# Patient Record
Sex: MALE | Race: WHITE
[De-identification: names, ages, dates, MRNs, and addresses within clinical notes are randomized per-mention and may not be internally consistent; named-entity substitution may affect disease eponyms.]

---

## 2020-01-06 ENCOUNTER — HOSPITAL ENCOUNTER (EMERGENCY)
Dept: HOSPITAL 39 - ER | Age: 65
Discharge: HOME | End: 2020-01-06
Payer: COMMERCIAL

## 2020-01-06 VITALS — DIASTOLIC BLOOD PRESSURE: 80 MMHG | TEMPERATURE: 98.1 F | SYSTOLIC BLOOD PRESSURE: 142 MMHG | OXYGEN SATURATION: 97 %

## 2020-01-06 DIAGNOSIS — N28.1: ICD-10-CM

## 2020-01-06 DIAGNOSIS — N13.2: Primary | ICD-10-CM

## 2020-01-06 DIAGNOSIS — K57.32: ICD-10-CM

## 2020-01-06 PROCEDURE — 74176 CT ABD & PELVIS W/O CONTRAST: CPT

## 2020-01-06 PROCEDURE — 85025 COMPLETE CBC W/AUTO DIFF WBC: CPT

## 2020-01-06 PROCEDURE — 80048 BASIC METABOLIC PNL TOTAL CA: CPT

## 2020-01-06 PROCEDURE — 80076 HEPATIC FUNCTION PANEL: CPT

## 2020-01-06 PROCEDURE — 81001 URINALYSIS AUTO W/SCOPE: CPT

## 2020-01-06 PROCEDURE — 36415 COLL VENOUS BLD VENIPUNCTURE: CPT

## 2020-01-06 PROCEDURE — 83690 ASSAY OF LIPASE: CPT

## 2020-01-06 NOTE — CT
EXAM DESCRIPTION: Abdoment/Pelvis w/o Contrast



CLINICAL HISTORY: 64 years Male, RLQ pain, hematuria



COMPARISON: None available.



TECHNIQUE: Contiguous 3 mm axial images were obtained from the

lung bases to the level of the proximal femora without the

administration of intravenous or oral contrast. Sagittal and

coronal reconstructions were reviewed.



FINDINGS: Limited evaluation of the solid organs due to the lack

of intravenous contrast.



THORAX: Mild atelectasis of the right lung base.



LIVER: The liver demonstrates normal size and density with no

intrahepatic biliary ductal dilatation.



GALLBLADDER: Grossly unremarkable.



PANCREAS: Appears normal with no cystic or solid lesions.



SPLEEN: Normal



ADRENAL GLANDS: Normal with no nodules or masses.



KIDNEYS: 1.5 cm exophytic isodense lesion is noted in the upper

pole of the right kidney. Mild hydroureteronephrosis of the right

kidney secondary to obstruction from a 4 mm calculus at the

ureterovesical junction. The visualized ureters appear grossly

unremarkable.



STOMACH: Small hiatal hernia. The stomach is moderately distended

with no gross abnormality.



SMALL BOWEL: The small bowel loops demonstrate variable degrees

of distention with no abnormal dilatation or other signs to

suggest bowel obstruction.



LARGE BOWEL: Constipation. Numerous diverticula are noted

throughout the visualized colon. Mild inflammatory stranding

surrounding the proximal sigmoid colon could represent early

changes of acute diverticulitis. The appendix is well-visualized

and appears normal



No evidence of free intraperitoneal air or fluid.



RETROPERITONEUM: The abdominal aorta is nonaneurysmal with mild

atherosclerosis. The inferior vena cava is normal in size and

caliber. Few subcentimeter retroperitoneal lymph nodes are

identified.



URINARY BLADDER: Not well distended limiting evaluation.



The prostate gland demonstrates few calcifications.



ADDITIONAL FINDINGS: Small fat-containing bilateral inguinal

hernias. 



BONES:  Mild degenerative changes are identified in the

visualized bones.No evidence of osteophytic or osteoblastic

lesions.



IMPRESSION:



1. Mild hydroureteronephrosis of the right kidney secondary to

obstruction from a 4 mm calculus at the ureterovesical junction.

2. 1.5 cm exophytic isodense lesion is noted in the upper pole of

the right kidney with a density of 17 Hounsfield units. This

could represent a complicated cyst.

3. Possible mild acute diverticulitis of the proximal sigmoid

colon. No perforation or abscess formation.



This exam was performed according to our departmental

dose-optimization program, which includes automated exposure

control, adjustment of the mA and/or kV according to patient size

and/or use of iterative reconstruction technique.





Electronically signed by:  Gracia Canchola MD  1/6/2020 4:22 PM

CST Workstation: [id number]

## 2020-01-06 NOTE — ED.PDOC
History of Present Illness





- General


Time Seen by Provider: 01/06/20 14:36


Source: patient





- History of Present Illness


Initial Comments: 





65 yo male who presents with cc of abdominal pain.  Sudden onset approx 30 mins 

ago at work, located to RLQ with radiation to right groin, testicle, and penis, 

constant, dull, 10/10, no change with position changes, nothing tried for 

relief.  Reports only small amount of urination since onset but no dysuria or 

hematuria.  Reports nausea but no vomiting.  Denies fevers, chills, diarrhea.  

No hx of similar sx's or kidney stones.


Allergies/Adverse Reactions: 


Allergies





NO KNOWN ALLERGY Allergy (Unverified 01/06/20 15:06)


   





Home Medications: 


Ambulatory Orders





ALPRAZolam [Xanax] 0.5 mg PO BID PRN #14 tab 03/01/15 


Cetirizine HCl [Zyrtec] 10 mg PO BID 03/01/15 


predniSONE 10 mg BID 03/01/15 


Acetaminophen W/ Codeine [Tylenol W/ CODEINE #3] 1 ea PO Q6H PRN 5 Days #15  

01/06/20 


Ciprofloxacin [Cipro] 500 mg PO BID 7 Days #14 tab 01/06/20 


Ondansetron Odt [Zofran ODT] 8 mg PO Q8H PRN 5 Days #10 tab 01/06/20 


Tamsulosin HCl [Flomax] 0.4 mg PO DAILY 7 Days #7 cap 01/06/20 











Review of Systems





- Review of Systems


Review of Systems: 





01/06/20 14:48


as per HPI





All other Systems: Reviewed and Negative





Past Medical History (General)





- Patient Medical History


Hx Asthma: No





- Vaccination History


Hx Influenza Vaccination: No





- Social History


Hx Tobacco Use: No


Hx Alcohol Use: No





- Female History


Patient Pregnant: No





Family Medical History





- Family History


  ** Mother


Family History: Unknown





Physical Exam





- Physical Exam


General Appearance: Alert, Anxious - due to pain


Eye Exam: bilateral normal


Ears, Nose, Throat: hearing grossly normal, normal ENT inspection


Neck: non-tender, full range of motion, supple, normal inspection


Respiratory: lungs clear, normal breath sounds, no respiratory distress, no 

accessory muscle use


Cardiovascular/Chest: normal peripheral pulses, regular rate, rhythm, no edema, 

no murmur


Peripheral Pulses: radial,right: 2+, radial,left: 2+


Gastrointestinal/Abdominal: non tender, soft, no organomegaly, other - testicles

 and penis appear normal without swelling/redness, no tenderness, no hernias 

appreciated


Back Exam: normal inspection, no CVA tenderness, no vertebral tenderness


Extremity: normal range of motion, non-tender, normal inspection, no pedal bairon

ma, no calf tenderness


Neurologic: CNs II-XII nml as tested, no motor/sensory deficits, alert, normal 

mood/affect, oriented x 3


Skin Exam: normal color, warm/dry





Progress





- Progress


Progress: 





01/06/20 14:50


Acute abdominal pain


-suspect kidney stone most likely.  Consider also acute appendicitis vs colitis 

vs muscle strain vs hernia vs other


-stat labs, UA


-place PIV, 1 L NS bolus, Fentanyl 50 mcg IV, Toradol 30 mg IV, Zofran 8 mg IV





01/06/20 17:11


-Labs reveal large blood on UA.  WBC is elevated to 11,000.  BUN/Cr wnl.


-CT A/P obtained and reveals obstructing distal 4 mm stone at right distal UVJ 

with mild associated hydroureteronephrosis.  Also noted is possible mild div

erticulitis in the sigmoid colon.  Incidentally noted Right 1.5 cm exophytic 

cyst as well.


-discussed dx of ureteral stone and acute diverticulitis as well as treatment 

plan and expected course.  Suspect great chance of passage given location and 

size.  Will trx with Rocephin 1 g IV, Cipro 500 PO, Flomax here.  Send home with

 Cipro 500 BID x7 days, Zofran PRN, Tylenol #3 PRN, Zofran PRN as well as work 

note.  Return warnings discussed.  F/u with PCP 1-2 weeks for repeat eval and to

 discuss right renal cyst.





Pankaj Leyva MD


Billing #[id number]





- Results/Orders


Results/Orders: 





                                        





01/06/20 14:36


IV Care:Saline Lock per Protoc QSHIFT 


Sodium Chloride 0.9% (Flush) [Saline Flush Syringe]   10 ml IV PRN PRN 








                         Laboratory Results - last 24 hr











  01/06/20 01/06/20 01/06/20





  14:40 14:40 14:40


 


WBC  11.0 H  


 


RBC  5.32  


 


Hgb  15.9  


 


Hct  47.3  


 


MCV  88.9  


 


MCH  29.8  


 


MCHC  33.6  


 


RDW  13.7  


 


Plt Count  243  


 


MPV  9.4  


 


Absolute Neuts (auto)  7.00 H  


 


Absolute Lymphs (auto)  2.80  


 


Absolute Monos (auto)  0.80  


 


Absolute Eos (auto)  0.30  


 


Absolute Basos (auto)  0.10  


 


Neutrophils %  63.5  


 


Lymphocytes %  25.7  


 


Monocytes %  7.2  


 


Eosinophils %  2.6  


 


Basophils %  1.0  


 


Sodium   141 


 


Potassium   3.5 L 


 


Chloride   104 


 


Carbon Dioxide   25 


 


Anion Gap   15.5 


 


BUN   18 


 


Creatinine   0.88 


 


BUN/Creatinine Ratio   20.5 H 


 


Random Glucose   98 


 


Serum Osmolality   283.1 


 


Calcium   9.5 


 


Total Bilirubin    0.7


 


Direct Bilirubin    0.2


 


Indirect Bilirubin    0.5


 


AST    23


 


ALT    14


 


Alkaline Phosphatase    77


 


Serum Total Protein    7.5


 


Albumin    4.3


 


Lipase    32


 


Urine Color   


 


Urine Appearance   


 


Urine pH   


 


Ur Specific Gravity   


 


Urine Protein   


 


Urine Glucose (UA)   


 


Urine Ketones   


 


Urine Blood   


 


Urine Nitrite   


 


Urine Bilirubin   


 


Urine Urobilinogen   


 


Ur Leukocyte Esterase   


 


Urine RBC   


 


Urine WBC   


 


Ur Epithelial Cells   


 


Urine Bacteria   


 


Urine Mucus   














  01/06/20





  15:00


 


WBC 


 


RBC 


 


Hgb 


 


Hct 


 


MCV 


 


MCH 


 


MCHC 


 


RDW 


 


Plt Count 


 


MPV 


 


Absolute Neuts (auto) 


 


Absolute Lymphs (auto) 


 


Absolute Monos (auto) 


 


Absolute Eos (auto) 


 


Absolute Basos (auto) 


 


Neutrophils % 


 


Lymphocytes % 


 


Monocytes % 


 


Eosinophils % 


 


Basophils % 


 


Sodium 


 


Potassium 


 


Chloride 


 


Carbon Dioxide 


 


Anion Gap 


 


BUN 


 


Creatinine 


 


BUN/Creatinine Ratio 


 


Random Glucose 


 


Serum Osmolality 


 


Calcium 


 


Total Bilirubin 


 


Direct Bilirubin 


 


Indirect Bilirubin 


 


AST 


 


ALT 


 


Alkaline Phosphatase 


 


Serum Total Protein 


 


Albumin 


 


Lipase 


 


Urine Color  Yellow


 


Urine Appearance  Sl cloudy


 


Urine pH  6.0


 


Ur Specific Gravity  1.025


 


Urine Protein  Trace


 


Urine Glucose (UA)  Negative


 


Urine Ketones  Negative


 


Urine Blood  Large H


 


Urine Nitrite  Negative


 


Urine Bilirubin  Negative


 


Urine Urobilinogen  1.0


 


Ur Leukocyte Esterase  Negative


 


Urine RBC  >50 H


 


Urine WBC  1-3


 


Ur Epithelial Cells  0-1


 


Urine Bacteria  Rare


 


Urine Mucus  Small














Departure





- Departure


Clinical Impression: 


 Ureteral stone with hydronephrosis, Diverticulitis, Renal cyst





Time of Disposition: 17:15


Disposition: Discharge to Home or Self Care


Condition: Fair


Instructions:  Kidney Stones (DC)


Diet: resume usual diet


Referrals: 


Henrique Pugh MD [Family Provider] - 1-2 Weeks


Prescriptions: 


Acetaminophen W/ Codeine [Tylenol W/ CODEINE #3] 1 ea PO Q6H PRN 5 Days #15 


 PRN Reason: Pain


Ciprofloxacin [Cipro] 500 mg PO BID 7 Days #14 tab


Ondansetron Odt [Zofran ODT] 8 mg PO Q8H PRN 5 Days #10 tab


 PRN Reason: Nausea


Tamsulosin HCl [Flomax] 0.4 mg PO DAILY 7 Days #7 cap


Home Medications: 


Ambulatory Orders





ALPRAZolam [Xanax] 0.5 mg PO BID PRN #14 tab 03/01/15 


Cetirizine HCl [Zyrtec] 10 mg PO BID 03/01/15 


predniSONE 10 mg BID 03/01/15 


Acetaminophen W/ Codeine [Tylenol W/ CODEINE #3] 1 ea PO Q6H PRN 5 Days #15  

01/06/20 


Ciprofloxacin [Cipro] 500 mg PO BID 7 Days #14 tab 01/06/20 


Ondansetron Odt [Zofran ODT] 8 mg PO Q8H PRN 5 Days #10 tab 01/06/20 


Tamsulosin HCl [Flomax] 0.4 mg PO DAILY 7 Days #7 cap 01/06/20 








Additional Instructions: 


Remain well hydrated and advance diet and activity level as tolerated.  Take 

ibuprofen 600 mg every 6 hours as needed and Tylenol as needed for pain.  You 

may take Tylenol #3 as directed for breakthrough pain.  Do not drive or operate 

heavy machinery when taking.  Return if symptoms return or worsen.  Follow up 

with your primary care doctor in 1-2 weeks for repeat evaluation and to further 

discuss the right kidney cyst which was noted on your cat scan this visit.

## 2020-01-07 ENCOUNTER — HOSPITAL ENCOUNTER (OUTPATIENT)
Dept: HOSPITAL 39 - ER | Age: 65
Setting detail: OBSERVATION
LOS: 1 days | Discharge: HOME | End: 2020-01-08
Attending: NURSE PRACTITIONER | Admitting: NURSE PRACTITIONER
Payer: COMMERCIAL

## 2020-01-07 DIAGNOSIS — N13.2: Primary | ICD-10-CM

## 2020-01-07 DIAGNOSIS — Z79.899: ICD-10-CM

## 2020-01-07 DIAGNOSIS — Z90.49: ICD-10-CM

## 2020-01-07 DIAGNOSIS — I70.0: ICD-10-CM

## 2020-01-07 DIAGNOSIS — K57.30: ICD-10-CM

## 2020-01-07 DIAGNOSIS — E86.0: ICD-10-CM

## 2020-01-07 DIAGNOSIS — Z87.891: ICD-10-CM

## 2020-01-07 PROCEDURE — 36415 COLL VENOUS BLD VENIPUNCTURE: CPT

## 2020-01-07 PROCEDURE — 99285 EMERGENCY DEPT VISIT HI MDM: CPT

## 2020-01-07 PROCEDURE — 81001 URINALYSIS AUTO W/SCOPE: CPT

## 2020-01-07 PROCEDURE — 85025 COMPLETE CBC W/AUTO DIFF WBC: CPT

## 2020-01-07 PROCEDURE — 96361 HYDRATE IV INFUSION ADD-ON: CPT

## 2020-01-07 PROCEDURE — 96375 TX/PRO/DX INJ NEW DRUG ADDON: CPT

## 2020-01-07 PROCEDURE — 80053 COMPREHEN METABOLIC PANEL: CPT

## 2020-01-07 PROCEDURE — 96376 TX/PRO/DX INJ SAME DRUG ADON: CPT

## 2020-01-07 PROCEDURE — 74176 CT ABD & PELVIS W/O CONTRAST: CPT

## 2020-01-07 PROCEDURE — 96365 THER/PROPH/DIAG IV INF INIT: CPT

## 2020-01-07 RX ADMIN — KETOROLAC TROMETHAMINE PRN MG: 30 INJECTION, SOLUTION INTRAMUSCULAR at 17:34

## 2020-01-07 RX ADMIN — SODIUM CHLORIDE, POTASSIUM CHLORIDE, SODIUM LACTATE AND CALCIUM CHLORIDE PRN MLS/HR: 600; 310; 30; 20 INJECTION, SOLUTION INTRAVENOUS at 17:34

## 2020-01-07 RX ADMIN — FENTANYL CITRATE PRN MCG: 50 INJECTION INTRAMUSCULAR; INTRAVENOUS at 19:40

## 2020-01-07 NOTE — HP
SUPERVISING PHYSICIAN:  Jesse Eagle M.D.



CHIEF COMPLAINT:  Right flank pain.



HISTORY OF PRESENT ILLNESS:  This is a 64 year-old male patient who came to the 
Emergency Room yesterday with right flank pain.  He was seen in the Emergency 
Room and found to have a ureteral kidney stone.  He was sent home with pain 
medication, however failed to improve and states that not only does he still 
have the pain but nausea and vomiting as well.  He returned to the Emergency 
Room for this reason.  He was seen in the Emergency Room.  A repeat CAT scan 
revealed a 4 mm ureteral stone which was described as being an obstructing stone
in the right ureterovesical junction with mild to moderate hydronephrosis.  His 
labs showed a normal white count but he does have a left shift of 82.3.  
Chemistry shows a preserved renal function.  Potassium at 3.5, bilirubin is up a
little bit at 1.1.  Urinalysis shows no significant sign of urinary tract 
infection.  The Emergency Room physician spoke with Dr. Gallegos in [place] 
and Dr. Gallegos informed him that with that size of stone and lack of sepsis that
the best course of action would be fluids, Flomax and pain control to try to get
him to pass it.  Therefore he has been referred for observation for those 
things.  At time of examination, the patient is comfortable after pain 
medication.  No nausea or vomiting currently at this time.



PAST MEDICAL HISTORY:  Negative.



PAST SURGICAL HISTORY:

1.   Left thumb surgery in the distant past.



CURRENT MEDICATIONS:

1.  Other than the Tylenol #3 that he was taking at home for pain control, he is
not

      on any current medications.



ALLERGIES:  NO KNOWN DRUG ALLERGIES.



FAMILY HISTORY:  Reviewed and noncontributory.



SOCIAL HISTORY:  The patient has a 30 pack year smoking history but quit 12 
years ago.  Rare alcohol.  He does not utilize any illegal drugs.



REVIEW OF SYSTEMS: 

CONSTITUTIONAL: No fever or chills.  No recent weight loss or weight gain.  

HEENT:  No headaches, vision changes, ear pain, nasal congestion or throat pain.
 

RESPIRATORY:  No cough, hemoptysis or pleuritic chest pain.

CARDIOVASCULAR:  No chest pain, palpitations or peripheral edema.

GASTROINTESTINAL:  Positive for nausea and vomiting.  No diarrhea.  No 
constipation and no abdominal pain.

GENITOURINARY:  No dysuria or frequency, but he does have right sided flank 
pain.

ENDOCRINE:  No polydipsia, polyuria or polyphagia.  No heat or cold intolerance.

HEMATOLOGIC:  No easy bruising or transfusions reaction.

SKIN:  No rashes, lesions or wounds.

MUSCULOSKELETAL:  No joint pain, joint swelling or muscle cramps.



PHYSICAL EXAMINATION: 



VITAL SIGNS:  Blood pressure 170/88, heart rate 92, respiratory rate 20, 
temperature 97.8, oxygen saturation 96%.



GENERAL:  Mr. Steven is a 64 year-old male patient in no active distress 
currently.  



NEUROLOGIC:  The patient is alert and oriented.



LUNGS:  Clear to auscultation bilaterally.



CARDIOVASCULAR:  Regular rate and rhythm.  Normal S1, S2.



ABDOMEN:  Soft.  Positive bowel sounds.  Does have some tenderness to palpation 
in the right flank area. 



BACK:  Positive for CVA tenderness.



EXTREMITIES:  Lower extremities with no edema.  



LABORATORY:  Labs and films are as discussed in history of present illness.



ASSESSMENT: 

1.   Right ureteral obstructing stone in the right ureterovesical junction with 
mild to 

      moderate hydronephrosis.

2.   Intractable nausea and vomiting.

3.   Dehydration secondary to #2.



PLAN:  Given his inability to control pain at home, I do find it prudent to 
admit him for pain control, fluids as well as Flomax to attempt to help him pass
the stone.  Will monitor for signs of infection, although urinalysis is 
currently normal.  Will strain his urine as well.



#[id number]

[facility]

## 2020-01-07 NOTE — CT
Study: CT abdomen and pelvis. 



Indication: KNOWN NEPHROLITHIASIS; PAIN NOT IMPROVING.  



Technique: CT of the abdomen and pelvis obtained without

intravenous contrast. This exam was performed according to our

departmental dose-optimization program, which includes automated

exposure control, adjustment of the mA and/or kV according to

patient size and/or use of iterative reconstruction technique. 



Comparison: January 6, 2020.



Findings:



Linear scar versus atelectasis in the lung bases. Heart is

normal. Corner artery and abdominal aortic atherosclerosis. 



Hepatic flexure lies anterior to the liver. Several tiny

subcentimeter low-density foci of the liver too small to

characterize on this noncontrast exam. Gallbladder, pancreas,

spleen, adrenal glands, left kidney, bladder, prostate gland

demonstrate stable appearance. 



Persistent 4 mm obstructing stone at the right UVJ with

associated mild to moderate right hydroureteronephrosis. Stable

slightly hyperdense exophytic lesion of the superior pole right

kidney.



Colonic diverticulosis. Stomach, small bowel, and appendix

unremarkable. Atherosclerosis aorta. Degenerative changes of the

spine noted.  



Impression:



Persistent 4 mm obstructing stone at the right UVJ with

mild-to-moderate right hydronephrosis. 



Stable tiny indeterminate complex lesion superior pole right

kidney. Renal sonogram could better evaluate.



Additional stable findings as above.



Electronically signed by:  Cayetano Bellamy MD  1/7/2020 1:31 PM CST

Workstation: [id number]

## 2020-01-07 NOTE — PCM.H&P
History of Present Illnes





- History of Present Illness


Reason for Visit: Right flank pain





- Past Surgical History


Past Surgical History: Other - Left thumb surgery





- Past Family History


Family History: None





- Past Social History


Smoke: # pack years - 30, Quit - 12 years ago


Alcohol: Rare


Drugs: None


Lives: With Family





Review of Systems





- Review of Systems


Constitutional: States: Weakness.  Denies: Fever, Chills


Eyes: Denies: Pain, Vision Change


ENT: Denies: Ear Pain, Throat Pain


Respiratory: Denies: Cough, Shortness of Breath, Wheezing


Cardiovascular: Denies: Chest Pain, Palpitations, Edema


Gastrointestinal: States: Nausea, Vomiting.  Denies: Abdominal Pain, Diarrhea, 

Constipation


Genitourinary: States: Other - right flank pain.  Denies: Dysuria, Frequency


Musculoskeletal: Denies: Neck Pain


Skin: Denies: Rash, Lesions


Neurological: Denies: Weakness, Numbness, Confusion





- Medications/Allergies


Allergies/Adverse Reactions: 


                                    Allergies











Allergy/AdvReac Type Severity Reaction Status Date / Time


 


NO KNOWN ALLERGY Allergy   Unverified 01/06/20 15:06








Medications: 


                               Current Medications





Fentanyl Citrate (Sublimaze Inj)  50 mcg IV Q2H PRN


   PRN Reason: PAIN -- MODERATE TO SEVERE


   Stop: 02/06/20 16:21


Lactated Ringer's (Lr)  1,000 mls @ 125 mls/hr IVS .QD PRN


   PRN Reason: IV THERAPY


   Stop: 02/06/20 16:19


Ketorolac Tromethamine (Toradol Inj)  30 mg IV Q6H PRN


   PRN Reason: PAIN -- MILD TO MODERATE


   Stop: 02/06/20 16:29


Sodium Chloride (Saline Flush Syringe)  3 ml IV PRN PRN


   PRN Reason: IV THERAPY


   Stop: 02/06/20 16:19


Tamsulosin HCl (Flomax)  0.4 mg PO DAILY SERA


   Stop: 02/07/20 08:59











Exam





- Exam


Vital Signs: 


                             Vital Signs (72 hours)











  01/07/20 01/07/20





  12:10 12:20


 


Temperature 97.8 F 


 


Pulse Rate [ 72 





right brahcial]  


 


Respiratory 20 24





Rate  


 


Blood Pressure 170/88 





[right brachial  





]  


 


O2 Sat by Pulse 96 





Oximetry  











General: Oriented x3, Cooperative, No acute distress


HEENT: PERRLA, Mucous membr. moist/pink


Lungs: Clear to auscultation


Cardiovascular: Regular rate, Normal S1, Normal S2


Abdomen: Normal bowel sounds, Soft


Extremities: No clubbing, No edema


Skin: No rashes


Psych/Mental Status: Mental status NL





Past Medical History





- Past Medical History


General Medical History: no medical history

## 2020-01-07 NOTE — ED.PDOC
History of Present Illness





- General


Chief Complaint:  Problem


Time Seen by Provider: 01/07/20 12:45


Source: patient


Exam Limitations: no limitations





- History of Present Illness


Initial Comments: 


DX'D HERE LAST NOC WITH R 4MM KIDNEY STONE AND POSSIBLE DIVERTICULITIS FOR WHICH

HE WAS APPROPRIATELY RX'D CIPRO.  


R FLANK PAIN NOT BETTER TODAY AND TYL#3 NOT HELPING, THUS RETURNED TO ER.  


CANNOT FIND A COMFORTABLE POSITION. 


Timing/Duration: yesterday, getting worse


Quality: severe, sharpness


Onset Location: right flank


Radiation: none


Activites at Onset: none


Prior abdominal problems: none


Improving Factors: nothing


Worsening Factors: nothing


Associated Symptoms: nausea/vomiting


Allergies/Adverse Reactions: 


Allergies





NO KNOWN ALLERGY Allergy (Unverified 01/06/20 15:06)


   





Home Medications: 


Ambulatory Orders





ALPRAZolam [Xanax] 0.5 mg PO BID PRN #14 tab 03/01/15 


Cetirizine HCl [Zyrtec] 10 mg PO BID 03/01/15 


predniSONE 10 mg BID 03/01/15 


Acetaminophen W/ Codeine [Tylenol W/ CODEINE #3] 1 ea PO Q6H PRN 5 Days #15  

01/06/20 


Ciprofloxacin [Cipro] 500 mg PO BID 7 Days #14 tab 01/06/20 


Ondansetron Odt [Zofran ODT] 8 mg PO Q8H PRN 5 Days #10 tab 01/06/20 


Tamsulosin HCl [Flomax] 0.4 mg PO DAILY 7 Days #7 cap 01/06/20 











Review of Systems





- Review of Systems


Constitutional: Denies: chills, fever


EENTM: States: no symptoms reported


Respiratory: States: no symptoms reported


Cardiology: States: no symptoms reported


Gastrointestinal/Abdominal: States: nausea - NONE AT PRESENT.  EARLIER PAIN 

TRIGGERED N/V..  Denies: abdominal pain


Genitourinary: Denies: dysuria, frequency, pain


Musculoskeletal: Denies: joint pain, neck pain


Skin: States: no symptoms reported


Neurological: States: no symptoms reported


Endocrine: States: no symptoms reported


Hematologic/Lymphatic: States: no symptoms reported


All other Systems: Reviewed and Negative





Past Medical History (General)





- Patient Medical History


Hx Asthma: No


Surgical History: appendectomy





- Vaccination History


Hx Influenza Vaccination: No


Hx Pneumococcal Vaccination: Yes





- Social History


Hx Tobacco Use: No


Hx Alcohol Use: No


Hx Substance Use: No


Hx Substance Use Treatment: No


Hx Depression: No





- Female History


Patient Pregnant: No





Family Medical History





- Family History


  ** Mother


Family History: Unknown





Physical Exam





- Physical Exam


General Appearance: Alert, Other - UNCOMFORTABLE


Eyes, Ears, Nose, Throat Exam: PERRL/EOMI, normal ENT inspection


Neck: non-tender, full range of motion


Cardiovascular/Respiratory: regular rate, rhythm, no M/R/G


Gastrointestinal/Abdominal: normal bowel sounds, non tender, soft, no 

organomegaly, no pulsatile mass


Back Exam: normal inspection, CVA tenderness (R)


Extremity: normal range of motion, normal inspection


Neurologic: alert, normal mood/affect, oriented x 3


Skin Exam: normal color, warm/dry


Lymphatic: no adenopathy





Progress





- Results/Orders


Results/Orders: 








CT 4MM STONE AT RIGHT UVJ WITH HYDROURETER.  STONE <6MM BUT UNMOVED FROM 

YESTERDAY. 


UA NEG (NO BLOOD).  CMP UNREMARKABLE.  CBC WBC NL BUT ELEV NEUTROPHILS.  





I TALKED WITH [place] [facility]ITAL[facility] ON CALL, MALINI VÁSQUEZ, FOR ADMISSION FOR 

PAIN CONTROL, FLOMAX, FLUIDS TO SEE IF THE SMALL STONE WILL PASS.  HE FEELS PT 

NEEDS FURTHER UROLOGIC CARE SUCH AS STENTING.  THUS [facility].  THE STONE THOUGH SMALL IS NOT PASSING BEYOND UVJ AND PAIN NOT 

CONTROLLED VIA OUTPT PO.  





WE CALLED UROLOGY TO SEE IF THEY CAN SEE HIM IN CLINIC TODAY FOR POSSIBLE 

URETEROSCOPY AND MANUAL STONE REMOVAL.  THEY SAID TO JUST SEND HIM TO THE ER. 





WE ARE NOW CALLING THE [facility] TRANSFER LINE TO TRY AND TALK WITH UROLOGY ON CALL.

  





I SPOKE WITH DR. PARRA, UROLOGY.  HE STATED AUA GUIDELINES STATE FLUIDS AND 

PAIN CONTROL 2-4 WKS TO SEE IF STONE WILL PASS (AS LONG AS PT NOT SEPTIC, WHICH 

HE IS NOT).  





I CALLED MALINI VÁSQUEZ AGAIN AND EXPLAINED TO HIM ALL OF THE ABOVE.  HE IS 

NOW WILLING TO ADMIT THE PT FOR IVF AND PAIN CONTROL TO SEE IF WILL PASS THE 

STONE.  





PROLONGED ER STAY DUE TO DIFFICULTY IN ARRANGING FURTHER PATIENT CARE.  











Departure





- Departure


Clinical Impression: 


 Nephrolithiasis, Hydroureter on right, Acute flank pain





Disposition: Admit Patient


Condition: Good


Departure Forms:  ED Discharge - Pt. Copy, Patient Portal Self Enrollment


Referrals: 


JUAN JOSÉ HALEY MD [Primary Care Provider] - 1-2 Weeks


Home Medications: 


Ambulatory Orders





ALPRAZolam [Xanax] 0.5 mg PO BID PRN #14 tab 03/01/15 


Cetirizine HCl [Zyrtec] 10 mg PO BID 03/01/15 


predniSONE 10 mg BID 03/01/15 


Acetaminophen W/ Codeine [Tylenol W/ CODEINE #3] 1 ea PO Q6H PRN 5 Days #15  

01/06/20 


Ciprofloxacin [Cipro] 500 mg PO BID 7 Days #14 tab 01/06/20 


Ondansetron Odt [Zofran ODT] 8 mg PO Q8H PRN 5 Days #10 tab 01/06/20 


Tamsulosin HCl [Flomax] 0.4 mg PO DAILY 7 Days #7 cap 01/06/20 











Decision To Admit





- Decistion To Admit


Decision to Admit Reason: Admit from ER


Decision to Admit Date: 01/07/20


Decision to Admit Time: 15:40

## 2020-01-08 VITALS — TEMPERATURE: 98 F | DIASTOLIC BLOOD PRESSURE: 78 MMHG | SYSTOLIC BLOOD PRESSURE: 147 MMHG | OXYGEN SATURATION: 96 %

## 2020-01-08 RX ADMIN — FENTANYL CITRATE PRN MCG: 50 INJECTION INTRAMUSCULAR; INTRAVENOUS at 02:25

## 2020-01-08 RX ADMIN — FENTANYL CITRATE PRN MCG: 50 INJECTION INTRAMUSCULAR; INTRAVENOUS at 00:23

## 2020-01-08 RX ADMIN — KETOROLAC TROMETHAMINE PRN MG: 30 INJECTION, SOLUTION INTRAMUSCULAR at 08:58

## 2020-01-08 RX ADMIN — SODIUM CHLORIDE, POTASSIUM CHLORIDE, SODIUM LACTATE AND CALCIUM CHLORIDE PRN MLS/HR: 600; 310; 30; 20 INJECTION, SOLUTION INTRAVENOUS at 00:27

## 2020-01-08 NOTE — DS
SUPERVISING PHYSICIAN:  Jesse Eagle MD



DISCHARGE DIAGNOSIS: 

1.   Right ureteral obstructing stone in the right ureterovesical junction with 
mild to 

      moderate hydronephrosis.

2.   Intractable nausea and vomiting.

3.   Dehydration secondary to #2.



HISTORY OF PRESENT ILLNESS: This is a 64-year-old male patient who came to the 
Emergency Room with right flank pain.  He had been seen in the Emergency Room 
and found to have a ureteral kidney stone.  He was sent home with pain 
medication, however failed to improve and stated that he had pain and nausea and
vomiting as well.  He returned to the Emergency Room for this reason.  A repeat 
CAT scan revealed a 4 mm ureteral stone which was described as being an 
obstructing stone in the right ureterovesical junction with mild to moderate 
hydronephrosis.  His labs showed a normal white count but he does have a left 
shift of 82.3.  Chemistry showed a preserved renal function.  Potassium at 3.5, 
bilirubin up at 1.1.  Urinalysis showed no significant sign of urinary tract 
infection.  The Emergency Room physician spoke with Dr. Gallegos in [place] 
and Dr. Gallegos informed him that due to the size of stone and lack of sepsis, 
the best course of action would be fluids, Flomax and pain control to try to get
him to pass it.  He has been referred for observation to the hospital.  



HOSPITAL COURSE:  The patient was given fluids as well as started on Flomax and 
pain medications which included fentanyl as well as IV Toradol.  Since 
admission, there has been no evidence of him passing the stone, but his last 
significant pain was at 2:30 this morning and he received a dose of fentanyl.  I
spoke with Dr. Washington, urologist in [place], to see what he recommended for 
the patient.  He said the patient could be discharged, but encouraged to 
increase his fluids, to continue the Flomax as well as give him some pain 
medications and that he would see him in his office in [place] on Monday. 
At this point, the patient's lab has been stable and he will be discharged home 
in stable condition.



LABORATORY:  WBCs have remained stable at 10.8 and 9.2.  Hemoglobin 12.9, 
hematocrit 39.  He does have a mild left shift on his differential.  
Electrolytes today are within normal limits with the exception of calcium 
slightly low at 8.2.  BUN 24, creatinine 11.9.  Urinalysis is completely within 
normal limits.  



RADIOLOGY:  Abdomen and pelvis CT showed a persistent 4 mm obstructing stone at 
the right ureterovesical junction with mild to moderate right hydronephrosis, 
stable tiny indeterminate complex lesion superior pole, right kidney.  Renal 
sonogram could better evaluate.  



DISCHARGE PLAN:  The patient will be discharged home in stable condition.  He is
to resume his previous medications and continue the Flomax as well as the Cipro.
 He also has a prescription from the Emergency Room for Tylenol #3 and I have 
given him an additional prescription for Toradol for 4 additional days.  He has 
an appointment with Dr. Adolph Washington, urologist in [place], on Monday, 
01/13/20 at 1:15 PM.  He has a hospital followup appointment with his primary 
care physician, Dr. Harman Abdul, on 01/14/20 at 10 AM.  He has been given 
instructions to increase his fluids, to resume his previous diet and activity.  
If his pain persists, he is to call Dr. Washington's office and I have given him that 
information so they can see him on an emergent basis.  He is to return to the 
hospital or call Dr. Washington's office or Dr. Abdul's office for any problems 
or complications.



DISCHARGE MEDICATIONS:

1.  Cetirizine.

2.  Xanax.

3.  Cipro.

4.  Acetaminophen with codeine #3.

5.  Zofran.

6.  Tamsulosin.

7.  Ketoralac.  



#[id number]

[facility]

## 2020-01-16 ENCOUNTER — HOSPITAL ENCOUNTER (OUTPATIENT)
Dept: HOSPITAL 39 - US | Age: 65
End: 2020-01-16
Attending: FAMILY MEDICINE
Payer: COMMERCIAL

## 2020-01-16 DIAGNOSIS — R93.421: Primary | ICD-10-CM

## 2020-01-17 NOTE — US
EXAM DESCRIPTION: 

Renal: Ultrasound.



CLINICAL HISTORY:

64 years Male ABNORMAL RADIOLOGIC FINDINGS ON DIAGNOSTIC IMAGING

OF RIGHT KIDNEY



COMPARISON: 

CT scan abdomen and pelvis without contrast 7 January 2020.



TECHNIQUE: 

Transcutaneous scanning: Two-dimensional and Doppler modes. 



FINDINGS: 

Right kidney measures 11.5 x 6.3 x 5.6 cm; mid-renal cortical

thickness 15 mm with normal cortical .echogenicity. No

hydronephrosis No echogenic stones. A solid mass of cortical

tissue is projecting from the upper pole of the left kidney and

impressing on the medial capsule of the inferior right lobe. This

tissue measures 1.4 x 1.7 x 1.3 cm and is vascular density may be

slightly lower than the adjacent cortex. Otherwise contour of the

kidney with no perinephric fluid. Normal vascularity.  Proximal

ureter not visualized..



Left kidney measures 12.6 x 5.5 x 5.4 cm; mid-renal cortical

thickness 14.5 mm.. Increased cortical echogenicity, less than

the liver. No hydronephrosis. No echogenic stones. Smooth contour

of the kidney with no perinephric fluid. Normal vascularity.. 

Proximal ureter not visualized..



Urinary bladder not visualized. Abdominal aorta: not measured.



IMPRESSION: 

1. Solid renal cortical tissue or mass projecting from the

lateral upper pole cortex, impressing on the medial capsule of

the inferior right hepatic lobe. This tissue may be slightly

hypoechoic compared to the cortex. Maximum dimension 1.7 cm, with

vascularity. No fluid component. Due to risk for renal tumor,

urologic consult is recommended.

2. Left kidney is unremarkable.



Electronically signed by:  Scott Chang MD  1/17/2020 9:54 AM CST

Workstation: [id number]

## 2020-02-05 ENCOUNTER — HOSPITAL ENCOUNTER (OUTPATIENT)
Dept: HOSPITAL 39 - CT | Age: 65
End: 2020-02-05
Attending: UROLOGY
Payer: COMMERCIAL

## 2020-02-05 DIAGNOSIS — D41.00: Primary | ICD-10-CM

## 2020-02-05 DIAGNOSIS — I77.9: ICD-10-CM

## 2020-02-05 DIAGNOSIS — K57.30: ICD-10-CM

## 2020-02-05 DIAGNOSIS — M47.897: ICD-10-CM

## 2020-02-05 DIAGNOSIS — K76.9: ICD-10-CM

## 2020-02-06 NOTE — CT
EXAM DESCRIPTION: 

Abdomen w/Contrast: Computed Tomography.



CLINICAL HISTORY: 

RENAL MASS. RIGHT.



COMPARISON: 

CT scan of the abdomen and pelvis without contrast January 7.

Ultrasound of the bilateral kidneys January 16.



TECHNIQUE: 

Spiral-axial scans at 5 x 5 mm intervals abdomen through the

upper pelvis, after nonionic IV contrast. No oral contrast.

Coronal and sagittal 2.0 mm reconstructions.  Delayed 5 mm scans,

liver to the upper pelvis. No adverse reactions.  Total Exam DLP:

1748.6 mGy-cm.  This exam was performed according to our

departmental CT dose-optimization program which includes

automated exposure control, adjustment of the mA and/or kV

according to patient size and/or use of iterative reconstruction

technique; to reduce radiation dose to as low as reasonably

achievable (ALARA).



FINDINGS: 

Kidneys: Again noted is a pedunculated mass projecting from the

lateral superior right renal cortex impressing on the abutting

medial capsule of the inferior right liver. Mass measures

approximately 1.6 x 1.4 cm in the axial plane and 1.5 cm parallel

to the capsular margin. Noncontrast density of the mass is plus

25. Mass density in the portal venous phase is +70. Density in

the 5 minute delay phase is +53. Density of the upper pole right

renal cortex in the portal venous phase is +122 no calcification.

No fatty stranding or perirenal fluid. Bilateral kidneys

otherwise unremarkable with no radiodense stones or

hydronephrosis. Proximal ureters are negative.



Lung bases and pleura: Scarring more likely than atelectasis in

the right lower lobe. No effusion. Coronary artery

calcifications.



Liver, Stomach, Spleen, Adrenal Glands: Minimally irregular

margin mass in the right lobe near the gallbladder fossa with

dimensions 1.7 x 1.6 cm. Density is +51 Hounsfield units, portal

venous phase and essentially same density on 5 minute delayed

images. Unusual orientation of the liver with the gallbladder

fossa and ghada hepatis directed anteriorly and slightly toward

the midline. In addition, peritoneal fatty space courses anterior

to the gallbladder fossa and ghada hepatis and contains the

hepatic flexure of the colon. Small sliding gastric hiatal hernia

but no obstruction. Other solid organs are negative.



Pancreas, Gallbladder, Ducts: Unremarkable, other than

gallbladder gallbladder fossa oriented anterior as previously

noted.



Mesentery: Negative.



Aorta: Moderate atherosclerotic calcification. 2.8 cm greatest

diameter just above the celiac axis ostium.



Small Bowel: Scattered fluid and gas with no distended segments

or significant air-fluid levels.



Terminal Ileum/Cecum: Normal caliber along with normal caliber of

the appendix. No surrounding inflammatory reaction in the fat.



Colon: Scattered gas and fecal matter. Diverticula noted in the

descending colon and continuing into the sigmoid to the lowest

scan visualized. No complications.



Spine: Spondylosis L5-S1 with desiccated disc gas and posterior

disc bulge.



Abdominal Wall/Back Soft Tissues: Unremarkable.



IMPRESSION: 

1. 1.6 x 1.5 x 1.4 cm pedunculated partially enhancing solid mass

projecting from the lateral aspect of the superior pole of the

right kidney and impressing on the adjacent medial capsule of the

right inferior liver. No calcifications. Renal cell carcinoma as

well as other benign and malignant renal entities should be

considered.

2. Partially circumscribed and partially ill-defined margin of

the low-density partially enhancing mass in the lateral aspect of

the right hepatic lobe above the level of the gallbladder fossa

and ghada hepatis measuring 1.7 x 1.6 cm. Metastatic bilateral

malignant lesion or benign primary lesion more likely than

primary neoplasm. Consider hepatic ultrasound and tissue

sampling. Please see Paragraph 1.

3. Diverticulosis without complications. L5-S1 spondylosis with

posterior bulging disc. 

4. 2.8 cm abdominal aortic aneurysm suspected. Recommend

follow-up every 5 years.

Reference: J Am Dian Radiol 2013;10:789-794.



Electronically signed by:  Scott Chang MD  2/6/2020 9:32 AM [facility]

Workstation: [id number]

## 2020-07-09 ENCOUNTER — HOSPITAL ENCOUNTER (OUTPATIENT)
Dept: HOSPITAL 39 - CT | Age: 65
End: 2020-07-09
Attending: UROLOGY
Payer: COMMERCIAL

## 2020-07-09 DIAGNOSIS — Z01.812: Primary | ICD-10-CM

## 2020-07-09 DIAGNOSIS — K57.30: ICD-10-CM

## 2020-07-09 DIAGNOSIS — D41.00: ICD-10-CM

## 2020-07-09 DIAGNOSIS — K76.9: ICD-10-CM

## 2020-07-09 NOTE — CT
EXAM DESCRIPTION: Abdomen/Pelvis w/Contrast



CLINICAL HISTORY: 64 years Male, NEOPLASM OF KIDNEY



COMPARISON: CT abdomen and pelvis dated 2/5/2020



TECHNIQUE: Contiguous 3 mm axial images were obtained from the

lung bases to the level of the proximal femora after  the

administration of intravenous and oral contrast. Sagittal and

coronal reconstructions were reviewed.



FINDINGS: 



THORAX: The imaged lower thorax demonstrates no gross

abnormality.



LIVER: 1.8 cm low-density lesion is noted in hepatic segment 8.

No intrahepatic biliary ductal dilatation.



GALLBLADDER: Grossly unremarkable.



PANCREAS: Appears normal with no cystic or solid lesions.



SPLEEN: Normal



ADRENAL GLANDS: Normal with no nodules or masses.



KIDNEYS: Stable 1.6 cm enhancing lesion in the upper pole of the

right kidney. No hydronephrosis or nephrolithiasis bilaterally.

The visualized ureters appear grossly unremarkable.  



STOMACH: Small hiatal hernia is noted. The stomach is not

well-distended limiting detailed evaluation.



SMALL BOWEL: The small bowel loops demonstrate variable degrees

of distention with no abnormal dilatation or other signs to

suggest bowel obstruction.



LARGE BOWEL: Multiple diverticula are noted throughout the

visualized colon, with no acute inflammation. The appendix is

well-visualized and appears normal



No evidence of free intraperitoneal air or fluid.



RETROPERITONEUM: The abdominal aorta is nonaneurysmal with

moderate atherosclerosis. The inferior vena cava is normal in

size and caliber. No abnormally enlarged retroperitoneal lymph

nodes are identified.



URINARY BLADDER: Not well distended limiting detailed evaluation.



Prostate gland demonstrates few calcifications. Seminal vesicles

appear normal.



ADDITIONAL FINDINGS: Small fat-containing bilateral inguinal

hernias. 



BONES:  Mild degenerative changes are identified in the

visualized bones.No evidence of osteophytic or osteoblastic

lesions.



IMPRESSION:

1.  Stable 1.6 cm enhancing lesion in the upper pole of the right

kidney. 

2. Stable 1.8 cm indeterminate low-density lesion in hepatic

segment 8.

3. Colonic diverticulosis.



This exam was performed according to our departmental

dose-optimization program, which includes automated exposure

control, adjustment of the mA and/or kV according to patient size

and/or use of iterative reconstruction technique.



Electronically signed by:  Grcaia Canchola MD  7/9/2020 9:33 AM

CDT Workstation: [id number]

## 2020-11-25 ENCOUNTER — HOSPITAL ENCOUNTER (OUTPATIENT)
Dept: HOSPITAL 39 - CT | Age: 65
End: 2020-11-25
Attending: UROLOGY
Payer: MEDICARE

## 2020-11-25 DIAGNOSIS — D37.6: ICD-10-CM

## 2020-11-25 DIAGNOSIS — Z01.812: Primary | ICD-10-CM

## 2020-11-25 DIAGNOSIS — K57.30: ICD-10-CM

## 2020-11-25 DIAGNOSIS — D41.01: ICD-10-CM

## 2020-11-25 NOTE — CT
EXAM DESCRIPTION: 

Abdomen w/Contrast



CLINICAL HISTORY: 

NEOPLASM OF UNCERTAIN BEHAVIOR OF LIVER,GB,AND BILE DUCT



COMPARISON: 

July 9, 2020



TECHNIQUE: 

Postcontrast CT images of the abdomen are obtained using standard

imaging protocol. This exam was performed according to our

departmental dose-optimization program, which includes automated

exposure control, adjustment of the mA and/or kV according to

patient size and/or use of iterative reconstruction technique .



FINDINGS: 

The visualized lung bases show mild linear scarring or

atelectasis in the right lower lobe. Mild elevation right

hemidiaphragm.



Small probable cyst in the dome of the left lobe liver.

Ill-defined low-attenuation lesion in the mid right lobe of the

liver, segment 8 measuring 2.1 cm is mildly increased size

compared to 1.8 cm on previous exam. Small hypodense lesion in

the more medial right lobe of the liver measuring 8 mm image 30

of series 2 is stable in retrospect.



Spleen, pancreas, adrenal glands, and gallbladder are

unremarkable.

Abdominal vasculature shows mild atherosclerotic disease.



No nephrolithiasis. Exophytic solid enhancing mass of the upper

pole right kidney measures 17 mm stable from previous. Tiny 3 to

4 mm cortical cyst of the medial mid pole right kidney is stable.

No hydronephrosis.



No pathologically enlarged abdominal or retroperitoneal

lymphadenopathy. Stomach poorly distended but unremarkable.

Visualized small bowel and colon show no acute findings.

Scattered diverticuli of the colon are seen. No free

intraperitoneal air. The osseous structures show no aggressive

bony lesions. Spondylitic changes of the spine are seen.



IMPRESSION: 

Slight interval increased size of indeterminate low-attenuation

lesion in segment 8 of the right lobe liver.



Stable solid enhancing likely renal cell carcinoma of the upper

pole right kidney compared to previous exam.



Interval resolution of nonobstructing right nephrolithiasis seen

on previous exam.



Colon diverticulosis without CT evidence of diverticulitis.



Electronically signed by:  Jerad Morgan MD  11/25/2020 1:58 PM CST

Workstation: [id number]